# Patient Record
Sex: MALE | Race: WHITE | NOT HISPANIC OR LATINO | Employment: OTHER
[De-identification: names, ages, dates, MRNs, and addresses within clinical notes are randomized per-mention and may not be internally consistent; named-entity substitution may affect disease eponyms.]

---

## 2017-01-20 PROCEDURE — 82378 CARCINOEMBRYONIC ANTIGEN: CPT | Performed by: INTERNAL MEDICINE

## 2017-01-20 PROCEDURE — 86301 IMMUNOASSAY TUMOR CA 19-9: CPT | Performed by: INTERNAL MEDICINE

## 2017-02-01 ENCOUNTER — HOSPITAL (OUTPATIENT)
Dept: OTHER | Age: 82
End: 2017-02-01
Attending: INTERNAL MEDICINE

## 2017-02-01 LAB
ANALYZER ANC (IANC): ABNORMAL
ERYTHROCYTE [DISTWIDTH] IN BLOOD: 15.4 % (ref 11–15)
HEMATOCRIT: 38.3 % (ref 39–51)
HGB BLD-MCNC: 12.4 GM/DL (ref 13–17)
INR PPP: 1.1
MCH RBC QN AUTO: 28.3 PG (ref 26–34)
MCHC RBC AUTO-ENTMCNC: 32.4 GM/DL (ref 32–36.5)
MCV RBC AUTO: 87.4 FL (ref 78–100)
PLATELET # BLD: 206 THOUSAND/MCL (ref 140–450)
PROTHROMBIN TIME: 11.9 SECONDS (ref 9.7–11.8)
PROTHROMBIN TIME: ABNORMAL
RBC # BLD: 4.38 MILLION/MCL (ref 4.5–5.9)
WBC # BLD: 4.6 THOUSAND/MCL (ref 4.2–11)

## 2017-02-14 PROBLEM — T45.1X5A PERIPHERAL NEUROPATHY DUE TO CHEMOTHERAPY (HCC): Status: ACTIVE | Noted: 2017-02-14

## 2017-02-14 PROBLEM — G62.0 PERIPHERAL NEUROPATHY DUE TO CHEMOTHERAPY (HCC): Status: ACTIVE | Noted: 2017-02-14

## 2017-02-14 PROBLEM — C16.2 MALIGNANT NEOPLASM OF BODY OF STOMACH (HCC): Status: ACTIVE | Noted: 2017-02-14

## 2017-02-14 PROCEDURE — 84443 ASSAY THYROID STIM HORMONE: CPT | Performed by: INTERNAL MEDICINE

## 2017-02-14 PROCEDURE — 36415 COLL VENOUS BLD VENIPUNCTURE: CPT | Performed by: INTERNAL MEDICINE

## 2017-03-23 ENCOUNTER — HOSPITAL (OUTPATIENT)
Dept: OTHER | Age: 82
End: 2017-03-23
Attending: FAMILY MEDICINE

## 2017-04-11 PROCEDURE — 84443 ASSAY THYROID STIM HORMONE: CPT | Performed by: INTERNAL MEDICINE

## 2017-04-11 PROCEDURE — 36415 COLL VENOUS BLD VENIPUNCTURE: CPT | Performed by: INTERNAL MEDICINE

## 2017-07-25 PROCEDURE — 82378 CARCINOEMBRYONIC ANTIGEN: CPT | Performed by: PHYSICIAN ASSISTANT

## 2017-07-25 PROCEDURE — 86301 IMMUNOASSAY TUMOR CA 19-9: CPT | Performed by: PHYSICIAN ASSISTANT

## 2017-08-14 PROCEDURE — 82607 VITAMIN B-12: CPT | Performed by: INTERNAL MEDICINE

## 2017-08-22 ENCOUNTER — OFFICE VISIT (OUTPATIENT)
Dept: SURGERY | Facility: CLINIC | Age: 82
End: 2017-08-22

## 2017-08-22 VITALS
WEIGHT: 165 LBS | DIASTOLIC BLOOD PRESSURE: 63 MMHG | BODY MASS INDEX: 23.36 KG/M2 | HEIGHT: 70.5 IN | TEMPERATURE: 98 F | SYSTOLIC BLOOD PRESSURE: 109 MMHG | HEART RATE: 76 BPM

## 2017-08-22 DIAGNOSIS — N40.1 BENIGN PROSTATIC HYPERPLASIA WITH URINARY FREQUENCY: Primary | ICD-10-CM

## 2017-08-22 DIAGNOSIS — R35.1 NOCTURIA: ICD-10-CM

## 2017-08-22 DIAGNOSIS — R35.0 BENIGN PROSTATIC HYPERPLASIA WITH URINARY FREQUENCY: Primary | ICD-10-CM

## 2017-08-22 PROCEDURE — 99214 OFFICE O/P EST MOD 30 MIN: CPT | Performed by: UROLOGY

## 2017-08-22 PROCEDURE — G0463 HOSPITAL OUTPT CLINIC VISIT: HCPCS | Performed by: UROLOGY

## 2017-08-22 NOTE — PROGRESS NOTES
HPI:    Patient ID: Vasiliy Espinzoa is a 80year old male. HPI    1.  Nocturia   Voiding Dysfunction  Patient has current AUA score of 4, mild voiding dysfunction category, mildly worse compared to previous score of 2, mild voiding dysfunction category, o Review of previous records:  --Please see above. In addition to above. .... Finasteride started July 7, 2009. PSA 11.7 January 11, 2007.  December 21, 2006, PSA 13.9.  5 negative prostate biopsies in the past - December 1992, May 1996, April 2001, September length; large intravesical median lobe; severe large BPH obstruction; bladder 3+ trabeculated without tumors.  Tamsulosin and finasteride continued. Stopped Tamsulosin September 2016.   Patient treated with surgery in February and with chemotherapy for stom Smoking status: Never Smoker                                                              Smokeless tobacco: Never Used                      Alcohol use: Yes           1.0 oz/week     Glasses of wine: 2 per week     Comment: social              Current Out I spent 25  minutes with patient, and majority of this time was spent discussing treatment options, answering questions about treatment and coordinating care.          ASSESSMENT/PLAN:   (N40.1,  R35.0) Benign prostatic hyperplasia with urinary frequency BPH can hurt your bladder and kidneys. It can also lead to bladder stones and urinary tract infections. If you think you may have BPH, talk with your healthcare provider. Early treatment can prevent problems. Several tests can diagnose BPH.  These include: If your BPH is severe, your health care provider may recommend surgery. Surgery takes out enlarged parts of the prostate gland. Your health care provider can figure out the best option for you based on your age, overall health, and other factors.   BPH and

## 2017-08-22 NOTE — PATIENT INSTRUCTIONS
1.  Continue finasteride 5 mg daily     2. Visit in 6 months. Please do urinalysis urine test before visit  Benign Prostatic Hyperplasia    The prostate is a small gland that makes semen. As you age, the prostate grows.  If it becomes too big, it may caus exercises may also help. They strengthen the pelvic muscle to prevent urine from leaking. While urinating, contract your pelvic muscle to stop or slow down the flow of urine. Hold for 10 seconds. Repeat at least 5 times.  Do the exercise 3 to 5 times each d

## 2017-10-30 PROCEDURE — 86301 IMMUNOASSAY TUMOR CA 19-9: CPT | Performed by: PHYSICIAN ASSISTANT

## 2017-10-30 PROCEDURE — 82378 CARCINOEMBRYONIC ANTIGEN: CPT | Performed by: PHYSICIAN ASSISTANT

## 2017-12-22 ENCOUNTER — APPOINTMENT (OUTPATIENT)
Dept: LAB | Facility: HOSPITAL | Age: 82
End: 2017-12-22
Attending: UROLOGY
Payer: MEDICARE

## 2017-12-22 DIAGNOSIS — R35.1 NOCTURIA: ICD-10-CM

## 2017-12-22 PROCEDURE — 81001 URINALYSIS AUTO W/SCOPE: CPT

## 2017-12-23 DIAGNOSIS — N30.00 ACUTE CYSTITIS WITHOUT HEMATURIA: Primary | ICD-10-CM

## 2017-12-27 ENCOUNTER — TELEPHONE (OUTPATIENT)
Dept: SURGERY | Facility: CLINIC | Age: 82
End: 2017-12-27

## 2017-12-27 NOTE — TELEPHONE ENCOUNTER
----- Message from Shawn De La Garza MD sent at 12/23/2017  9:55 PM CST -----  Nurses, please call patient --  Have him submit  a urine culture because urinalysis is suspicious for possible urinary tract infection.  URINE SPECIMEN COLLECTION --    If you AR

## 2017-12-27 NOTE — TELEPHONE ENCOUNTER
Patient contacted. Relayed Dr. Zach Hodge' message below to patient. Patient is aware of his test results and verbalized understanding.   Patient is aware to submit mid stream clean catch urine specimen for urine culture, call 2 days after for his results an

## 2017-12-28 ENCOUNTER — APPOINTMENT (OUTPATIENT)
Dept: LAB | Facility: HOSPITAL | Age: 82
End: 2017-12-28
Attending: UROLOGY
Payer: MEDICARE

## 2017-12-28 DIAGNOSIS — N30.00 ACUTE CYSTITIS WITHOUT HEMATURIA: ICD-10-CM

## 2017-12-28 PROCEDURE — 87086 URINE CULTURE/COLONY COUNT: CPT

## 2017-12-28 PROCEDURE — 87077 CULTURE AEROBIC IDENTIFY: CPT

## 2017-12-28 PROCEDURE — 87186 SC STD MICRODIL/AGAR DIL: CPT

## 2018-01-02 DIAGNOSIS — N41.0 PROSTATITIS, ACUTE: Primary | ICD-10-CM

## 2018-01-02 RX ORDER — SULFAMETHOXAZOLE AND TRIMETHOPRIM 800; 160 MG/1; MG/1
1 TABLET ORAL 2 TIMES DAILY
Qty: 30 TABLET | Refills: 0 | Status: SHIPPED | OUTPATIENT
Start: 2018-01-02 | End: 2018-01-17

## 2018-01-26 PROBLEM — S21.209A: Status: ACTIVE | Noted: 2018-01-26

## 2018-01-26 PROBLEM — S31.819A WOUND OF RIGHT BUTTOCK: Status: ACTIVE | Noted: 2018-01-26

## 2018-01-26 PROCEDURE — 86301 IMMUNOASSAY TUMOR CA 19-9: CPT | Performed by: PHYSICIAN ASSISTANT

## 2018-01-26 PROCEDURE — 82378 CARCINOEMBRYONIC ANTIGEN: CPT | Performed by: PHYSICIAN ASSISTANT

## 2018-02-08 ENCOUNTER — OFFICE VISIT (OUTPATIENT)
Dept: WOUND CARE | Facility: HOSPITAL | Age: 83
End: 2018-02-08
Attending: NURSE PRACTITIONER
Payer: MEDICARE

## 2018-02-08 DIAGNOSIS — S31.819D OPEN WOUND OF RIGHT BUTTOCK, SUBSEQUENT ENCOUNTER: Primary | ICD-10-CM

## 2018-02-08 DIAGNOSIS — S21.209A OPEN WOUND OF BACK, COMPLICATED: ICD-10-CM

## 2018-02-08 PROCEDURE — 97162 PT EVAL MOD COMPLEX 30 MIN: CPT

## 2018-02-08 NOTE — PROGRESS NOTES
Subjective    Chief Complaint  This information was obtained from the patient  The patient is new to the [address] [facility],[address] here for an initial visit for the evaluation and management of non-healing wound(s).  R buttock and back pressure ulcers    Allergies  NKA diltiazem ER (XR/XT) 180 mg capsule,extended release 24 hr, controlled oral capsule,ext. rel 24h degradable oral once daily  apixaban 5 mg tablet oral tablet oral twice daily  levothyroxine 88 mcg tablet oral tablet oral once daily        Objective    Const Pt is an 80 y.o. man who presents with a superficial R buttock wound that is nearly 100% granular as well as a wound on the midback that is somewhat suspicious in nature.   This wound has moderate drainage and is 0.7cm raised with hypergranular tissue, harrington See pt 1x/week for up to 10 visits for R buttock and back wound. Pt to make an appt with derm Dr. Gabriel Snow. Will order supplies for home dressing changes.   Treatment may include nonselective and selective debridement, advanced wound dressings, possible us

## 2018-02-15 ENCOUNTER — APPOINTMENT (OUTPATIENT)
Dept: WOUND CARE | Facility: HOSPITAL | Age: 83
End: 2018-02-15
Attending: NURSE PRACTITIONER
Payer: MEDICARE

## 2018-02-15 DIAGNOSIS — S21.209D OPEN WOUND OF BACK WITH COMPLICATION, UNSPECIFIED LATERALITY, SUBSEQUENT ENCOUNTER: ICD-10-CM

## 2018-02-15 DIAGNOSIS — S31.819D OPEN WOUND OF RIGHT BUTTOCK, SUBSEQUENT ENCOUNTER: Primary | ICD-10-CM

## 2018-02-15 PROCEDURE — 82607 VITAMIN B-12: CPT | Performed by: INTERNAL MEDICINE

## 2018-02-15 PROCEDURE — 99213 OFFICE O/P EST LOW 20 MIN: CPT

## 2018-02-19 ENCOUNTER — OFFICE VISIT (OUTPATIENT)
Dept: WOUND CARE | Facility: HOSPITAL | Age: 83
End: 2018-02-19
Attending: NURSE PRACTITIONER
Payer: MEDICARE

## 2018-02-19 DIAGNOSIS — S21.209D OPEN WOUND OF BACK WITH COMPLICATION, UNSPECIFIED LATERALITY, SUBSEQUENT ENCOUNTER: Primary | ICD-10-CM

## 2018-02-19 PROCEDURE — 99212 OFFICE O/P EST SF 10 MIN: CPT

## 2018-02-19 NOTE — PROGRESS NOTES
Subjective    Chief Complaint  This information was obtained from the patient  The patient is new to the [address] [facility],[address] here for an initial visit for the evaluation and management of non-healing wound(s). R buttock and back pressure ulcers.   2/19/18: Pt stat contact to make an appt. Will try to coordinate for appt in the [facility]. Entered By: Gena Bass on 02/19/2018 4:02:09 PM   Signature(s): Date(s):  Treatment Notes Summary  Wound #2 (Back)  . Wound Treatment Note  Cleansed wound and periwound wit

## 2018-02-22 ENCOUNTER — OFFICE VISIT (OUTPATIENT)
Dept: WOUND CARE | Facility: HOSPITAL | Age: 83
End: 2018-02-22
Attending: NURSE PRACTITIONER
Payer: MEDICARE

## 2018-02-22 DIAGNOSIS — S21.209D OPEN WOUND OF BACK WITH COMPLICATION, UNSPECIFIED LATERALITY, SUBSEQUENT ENCOUNTER: Primary | ICD-10-CM

## 2018-02-22 DIAGNOSIS — S31.819D OPEN WOUND OF RIGHT BUTTOCK, SUBSEQUENT ENCOUNTER: ICD-10-CM

## 2018-02-22 PROCEDURE — 99212 OFFICE O/P EST SF 10 MIN: CPT

## 2018-02-22 NOTE — PROGRESS NOTES
Subjective    Chief Complaint  This information was obtained from the patient  The patient is new to the [address] [facility],[address] here for an initial visit for the evaluation and management of non-healing wound(s). R buttock and back pressure ulcers. 02/22/2018:  No c Wound #2 Back is a Stage 4 Pressure Injury Pressure Ulcer and has received a status of Not Healed. Measurements are 1.3cm length x 0.9cm width x 0.7cm depth, with an area of 1.17 sq cm and a volume of 0.819 cubic cm. Hypergranulation was noted.  Undermining Cleansed wound and periwound with non-cytotoxic agent. using Wound Cleanser Spray (1)  Applied Primary Wound Dressing. using Iodoform Packing Strip (1)  Applied Secondary Wound Dressing.  using Gauze (sterile) 4x4 (3)  Dressing secured with non-allergenic t

## 2018-02-26 ENCOUNTER — OFFICE VISIT (OUTPATIENT)
Dept: WOUND CARE | Facility: HOSPITAL | Age: 83
End: 2018-02-26
Attending: NURSE PRACTITIONER
Payer: MEDICARE

## 2018-02-26 DIAGNOSIS — S21.209D OPEN WOUND OF BACK WITH COMPLICATION, UNSPECIFIED LATERALITY, SUBSEQUENT ENCOUNTER: Primary | ICD-10-CM

## 2018-02-26 PROCEDURE — 99212 OFFICE O/P EST SF 10 MIN: CPT

## 2018-02-26 NOTE — PROGRESS NOTES
Subjective    Chief Complaint  This information was obtained from the patient  The patient is new to the [address] [facility],[address] here for an initial visit for the evaluation and management of non-healing wound(s). R buttock and back pressure ulcers. 02/26/2018:  No c and f/u in the wound clinic 3/1. Entered By: Martina Chase on 02/26/2018 11:03:45 AM   Signature(s): Date(s):  Treatment Notes Summary  Wound #2 (Back)  . Wound Treatment Note  Cleansed wound and periwound with non-cytotoxic agent.  using Wound C

## 2018-02-27 ENCOUNTER — OFFICE VISIT (OUTPATIENT)
Dept: SURGERY | Facility: CLINIC | Age: 83
End: 2018-02-27

## 2018-02-27 VITALS
TEMPERATURE: 98 F | HEART RATE: 68 BPM | DIASTOLIC BLOOD PRESSURE: 70 MMHG | WEIGHT: 170 LBS | BODY MASS INDEX: 24.34 KG/M2 | SYSTOLIC BLOOD PRESSURE: 117 MMHG | HEIGHT: 70 IN

## 2018-02-27 DIAGNOSIS — Z87.440 HISTORY OF UTI: ICD-10-CM

## 2018-02-27 DIAGNOSIS — R35.0 BENIGN PROSTATIC HYPERPLASIA WITH URINARY FREQUENCY: Primary | ICD-10-CM

## 2018-02-27 DIAGNOSIS — R35.1 NOCTURIA: ICD-10-CM

## 2018-02-27 DIAGNOSIS — N40.1 BENIGN PROSTATIC HYPERPLASIA WITH URINARY FREQUENCY: Primary | ICD-10-CM

## 2018-02-27 LAB
BACTERIA UR QL AUTO: NEGATIVE /HPF
BILIRUB UR QL: NEGATIVE
CLARITY UR: CLEAR
COLOR UR: YELLOW
GLUCOSE UR-MCNC: NEGATIVE MG/DL
HGB UR QL STRIP.AUTO: NEGATIVE
KETONES UR-MCNC: NEGATIVE MG/DL
NITRITE UR QL STRIP.AUTO: NEGATIVE
PH UR: 5 [PH] (ref 5–8)
PROT UR-MCNC: NEGATIVE MG/DL
RBC #/AREA URNS AUTO: 2 /HPF
SP GR UR STRIP: 1.02 (ref 1–1.03)
UROBILINOGEN UR STRIP-ACNC: <2
VIT C UR-MCNC: 20 MG/DL
WBC #/AREA URNS AUTO: 14 /HPF

## 2018-02-27 PROCEDURE — 99214 OFFICE O/P EST MOD 30 MIN: CPT | Performed by: UROLOGY

## 2018-02-27 PROCEDURE — G0463 HOSPITAL OUTPT CLINIC VISIT: HCPCS | Performed by: UROLOGY

## 2018-02-27 NOTE — PATIENT INSTRUCTIONS
1. UA and urine culture today to exclude possible UTI; we will notify you the results; if urine culture shows infection, we will likely call in antibiotic    2. Continue finasteride 5 mg daily     3. Visit in 6 months.   Please do urinalysis urine test and

## 2018-02-27 NOTE — PROGRESS NOTES
HPI:    Patient ID: Rima Nam is a 80year old male. HPI    1. Nocturia   Voiding Dysfunction  Chronic condition.  Patient has current AUA score of 10, moderate voiding dysfunction category,   worse compared to previous score of 4, mild voiding dysf Review of previous records:  --Please see above. In addition to above. .... Finasteride started July 7, 2009. PSA 11.7 January 11, 2007.  December 21, 2006, PSA 13.9.  5 negative prostate biopsies in the past - December 1992, May 1996, April 2001, September length; large intravesical median lobe; severe large BPH obstruction; bladder 3+ trabeculated without tumors.  Tamsulosin and finasteride continued. Stopped Tamsulosin September 2016.   Patient treated with surgery in February and with chemotherapy for stom 2013: LAMINECTOMY  1/6/6= Hiatal hernia, Gastric adenocarcinoma: UPPER GI ENDOSCOPY,BIOPSY   Family History   Problem Relation Age of Onset   • Heart Disorder Father    • Cancer Neg       Smoking status: Never Smoker Temp: 98.1 °F (36.7 °C)   TempSrc: Oral   Weight: 170 lb (77.1 kg)   Height: 5' 10\" (1.778 m)         Body mass index is 24.39 kg/m².     Laboratories  12/28/17: Urine Culture: >100,000 CFU/ML Escherichia coli, pt was Rxed with Bactrim DS twice daily for 1 Sade Yusuf,  personally performed the services described in this documentation. All medical record entries made by the scribe were at my direction and in my presence.   I have reviewed the chart and discharge instructions (if applicable) and agree that

## 2018-02-28 PROBLEM — S21.209A: Status: ACTIVE | Noted: 2018-02-28

## 2018-03-01 ENCOUNTER — APPOINTMENT (OUTPATIENT)
Dept: WOUND CARE | Facility: HOSPITAL | Age: 83
End: 2018-03-01
Attending: NURSE PRACTITIONER
Payer: MEDICARE

## 2018-03-01 DIAGNOSIS — S21.209D OPEN WOUND OF BACK WITH COMPLICATION, UNSPECIFIED LATERALITY, SUBSEQUENT ENCOUNTER: Primary | ICD-10-CM

## 2018-03-01 PROCEDURE — 99212 OFFICE O/P EST SF 10 MIN: CPT

## 2018-03-01 NOTE — PROGRESS NOTES
Subjective    Chief Complaint  This information was obtained from the patient  The patient is new to the [address] [facility],[address] here for an initial visit for the evaluation and management of non-healing wound(s). R buttock and back pressure ulcers. 03/1/2018:  No co General Notes: White material expressed from pinhole opening just inferior to wound bed        Assessment  Pt's back wound continues to present with tunneling/undermining, although the access to this is narrowing.   White substance continues to be expresse

## 2018-03-05 ENCOUNTER — OFFICE VISIT (OUTPATIENT)
Dept: WOUND CARE | Facility: HOSPITAL | Age: 83
End: 2018-03-05
Attending: NURSE PRACTITIONER
Payer: MEDICARE

## 2018-03-05 DIAGNOSIS — S21.209D OPEN WOUND OF BACK WITH COMPLICATION, UNSPECIFIED LATERALITY, SUBSEQUENT ENCOUNTER: Primary | ICD-10-CM

## 2018-03-05 PROCEDURE — 99211 OFF/OP EST MAY X REQ PHY/QHP: CPT

## 2018-03-05 NOTE — PROGRESS NOTES
Subjective    Chief Complaint  This information was obtained from the patient  The patient is new to the [address] [facility],[address] here for an initial visit for the evaluation and management of non-healing wound(s). R buttock and back pressure ulcers. 03/05/2018:  No c without penetration into thoracic cavity, subsequent encounter        Plan  Cont 1x/week to ensure ongoing wound progress/closure.             Entered By: Nuvia Webb on 03/05/2018 10:09:33 AM   Signature(s): Date(s):  Treatment Notes Summary  Wound #2 (

## 2018-03-12 ENCOUNTER — APPOINTMENT (OUTPATIENT)
Dept: WOUND CARE | Facility: HOSPITAL | Age: 83
End: 2018-03-12
Attending: NURSE PRACTITIONER
Payer: MEDICARE

## 2018-03-12 DIAGNOSIS — S21.209D OPEN WOUND OF BACK WITH COMPLICATION, UNSPECIFIED LATERALITY, SUBSEQUENT ENCOUNTER: Primary | ICD-10-CM

## 2018-03-12 PROCEDURE — 99212 OFFICE O/P EST SF 10 MIN: CPT

## 2018-03-12 NOTE — PROGRESS NOTES
Subjective    Chief Complaint  This information was obtained from the patient  The patient is new to the [address] [facility],[address] here for an initial visit for the evaluation and management of non-healing wound(s). R buttock and back pressure ulcers.   03/12/2018: Kalpana Blanca Pt's back wound is very nearly resolved this session, however pinpoint opening just inferior to main wound continues to express drainage/white material when palpated/expressed. Will have pt f/u again in ~ 10 days to assess if any drainage remains trapped.

## 2018-03-15 ENCOUNTER — APPOINTMENT (OUTPATIENT)
Dept: WOUND CARE | Facility: HOSPITAL | Age: 83
End: 2018-03-15
Attending: NURSE PRACTITIONER
Payer: MEDICARE

## 2018-03-19 ENCOUNTER — APPOINTMENT (OUTPATIENT)
Dept: WOUND CARE | Facility: HOSPITAL | Age: 83
End: 2018-03-19
Attending: NURSE PRACTITIONER
Payer: MEDICARE

## 2018-03-22 ENCOUNTER — OFFICE VISIT (OUTPATIENT)
Dept: WOUND CARE | Facility: HOSPITAL | Age: 83
End: 2018-03-22
Attending: NURSE PRACTITIONER
Payer: MEDICARE

## 2018-03-22 ENCOUNTER — TELEPHONE (OUTPATIENT)
Dept: SURGERY | Facility: CLINIC | Age: 83
End: 2018-03-22

## 2018-03-22 DIAGNOSIS — S21.209D OPEN WOUND OF BACK WITH COMPLICATION, UNSPECIFIED LATERALITY, SUBSEQUENT ENCOUNTER: Primary | ICD-10-CM

## 2018-03-22 PROCEDURE — 99211 OFF/OP EST MAY X REQ PHY/QHP: CPT

## 2018-03-22 NOTE — PROGRESS NOTES
Subjective    Chief Complaint  This information was obtained from the patient  The patient is new to the [address] [facility],[address] here for an initial visit for the evaluation and management of non-healing wound(s). R buttock and back pressure ulcers. 03/22/2018:  No n The periwound skin moisture is normal. The periwound skin color is normal. The periwound skin exhibited: Fluctuance.    General Notes:  Small pinhole opening inferiorlateral to wound (R) remains and sanguinous/white cyst material expressed via periwound pal The projected goal for this patient was [id number], 0% impaired. The patient's functional level at discharge is [id number], 0% impaired, determined by clinical judgement.            Entered By: Cisco Lees on 04/19/2018 2:56:34 PM   Signature(s): Date(s):  Som

## 2018-03-22 NOTE — TELEPHONE ENCOUNTER
Patient states he had a U/A and Culture done on 02/27/18 and has not received a call back with results. States has been waiting to see if he needs to start taking antibiotics again. Requesting a call back asap. Please call. Thank you.

## 2018-03-23 NOTE — TELEPHONE ENCOUNTER
Phoned pt and spoke with him. Read to him 's result note as outlined below in this encounter, in it's entirety. Pt verbalized understanding,agrees to plan, and is thankful.     Written by Kris Gauthier MD on 3/1/2018 10:57 PM   2/27/18 urine cult

## 2018-04-26 PROBLEM — R94.8 ABNORMAL POSITRON EMISSION TOMOGRAPHY (PET) SCAN: Status: ACTIVE | Noted: 2018-04-26

## 2018-04-26 PROBLEM — D64.9 ANEMIA: Status: ACTIVE | Noted: 2018-04-26

## 2018-04-26 PROCEDURE — 82378 CARCINOEMBRYONIC ANTIGEN: CPT | Performed by: PHYSICIAN ASSISTANT

## 2018-04-26 PROCEDURE — 86301 IMMUNOASSAY TUMOR CA 19-9: CPT | Performed by: PHYSICIAN ASSISTANT

## 2018-07-26 PROBLEM — G62.0 CHEMOTHERAPY-INDUCED NEUROPATHY (HCC): Status: ACTIVE | Noted: 2018-07-26

## 2018-07-26 PROBLEM — T45.1X5A CHEMOTHERAPY-INDUCED NEUROPATHY (HCC): Status: ACTIVE | Noted: 2018-07-26

## 2018-07-26 PROCEDURE — 82378 CARCINOEMBRYONIC ANTIGEN: CPT | Performed by: PHYSICIAN ASSISTANT

## 2018-07-26 PROCEDURE — 86301 IMMUNOASSAY TUMOR CA 19-9: CPT | Performed by: PHYSICIAN ASSISTANT

## 2018-08-15 PROCEDURE — 82607 VITAMIN B-12: CPT | Performed by: INTERNAL MEDICINE

## 2018-08-23 ENCOUNTER — APPOINTMENT (OUTPATIENT)
Dept: LAB | Facility: HOSPITAL | Age: 83
End: 2018-08-23
Attending: UROLOGY
Payer: MEDICARE

## 2018-08-23 DIAGNOSIS — R35.1 NOCTURIA: ICD-10-CM

## 2018-08-23 DIAGNOSIS — Z87.440 HISTORY OF UTI: ICD-10-CM

## 2018-08-23 LAB
BACTERIA UR QL AUTO: NEGATIVE /HPF
BILIRUB UR QL: NEGATIVE
CLARITY UR: CLEAR
COLOR UR: YELLOW
GLUCOSE UR-MCNC: NEGATIVE MG/DL
HGB UR QL STRIP.AUTO: NEGATIVE
KETONES UR-MCNC: NEGATIVE MG/DL
NITRITE UR QL STRIP.AUTO: NEGATIVE
PH UR: 5 [PH] (ref 5–8)
PROT UR-MCNC: NEGATIVE MG/DL
RBC #/AREA URNS AUTO: 1 /HPF
SP GR UR STRIP: 1.02 (ref 1–1.03)
UROBILINOGEN UR STRIP-ACNC: <2
VIT C UR-MCNC: NEGATIVE MG/DL
WBC #/AREA URNS AUTO: 7 /HPF

## 2018-08-23 PROCEDURE — 87086 URINE CULTURE/COLONY COUNT: CPT

## 2018-08-23 PROCEDURE — 81001 URINALYSIS AUTO W/SCOPE: CPT

## 2018-08-28 ENCOUNTER — OFFICE VISIT (OUTPATIENT)
Dept: SURGERY | Facility: CLINIC | Age: 83
End: 2018-08-28
Payer: MEDICARE

## 2018-08-28 VITALS
HEIGHT: 70 IN | HEART RATE: 62 BPM | BODY MASS INDEX: 24.34 KG/M2 | RESPIRATION RATE: 16 BRPM | DIASTOLIC BLOOD PRESSURE: 62 MMHG | WEIGHT: 170 LBS | TEMPERATURE: 98 F | SYSTOLIC BLOOD PRESSURE: 130 MMHG

## 2018-08-28 DIAGNOSIS — R35.0 BENIGN PROSTATIC HYPERPLASIA WITH URINARY FREQUENCY: Primary | ICD-10-CM

## 2018-08-28 DIAGNOSIS — Z87.440 HISTORY OF UTI: ICD-10-CM

## 2018-08-28 DIAGNOSIS — N40.1 BENIGN PROSTATIC HYPERPLASIA WITH URINARY FREQUENCY: Primary | ICD-10-CM

## 2018-08-28 DIAGNOSIS — R35.1 NOCTURIA: ICD-10-CM

## 2018-08-28 PROCEDURE — 99214 OFFICE O/P EST MOD 30 MIN: CPT | Performed by: UROLOGY

## 2018-08-28 PROCEDURE — G0463 HOSPITAL OUTPT CLINIC VISIT: HCPCS | Performed by: UROLOGY

## 2018-08-28 RX ORDER — FINASTERIDE 5 MG/1
5 TABLET, FILM COATED ORAL DAILY
COMMUNITY
End: 2018-09-21

## 2018-08-28 NOTE — PATIENT INSTRUCTIONS
1.   Continue finasteride 5 mg daily    2. Please call my office nurses on an urgent basis if you ever develop burning pain with urination, which would be suspicious for urinary tract infection    3.   Please start concentrated cranberry pill twice daily t

## 2018-08-28 NOTE — PROGRESS NOTES
HPI:    Patient ID: Krishan Barboza is a 80year old male. HPI       Nocturia  Chronic condition.  Problem started many years ago; severity: Patient has current AUA score of 5, mild voiding dysfunction category,  better compared to previous score of 10, m the prostate at least 100 grams. Urine for cytology March 6, 2007 - negative. enterococcus UTI February 2009--sensitive to Cipro with which it was treated. Urinary retention 1200 cc after Knee replacement (Left) @ [facility].  October 2011.   Ultimately Neurological: Negative for speech difficulty.      HISTORY:  Past Medical History:   Diagnosis Date   • B12 deficiency    • BPH associated with nocturia    • Elevated PSA 2010    prostate biopsies   • Essential hypertension    • H/O cystoscopy 2007   • He Cyanocobalamin (B-12) 2500 MCG Oral Tab Take by mouth.  Disp:  Rfl:    triamcinolone acetonide 0.1 % External Ointment Apply to R buttock bid Disp: 30 g Rfl: 0     Allergies:No Known Allergies   PHYSICAL EXAM:   Physical Exam   Constitutional: He appears problem is stable. Discussed continuing finasteride vs greenlight laser ablation of prostate under general anesthesia vs observation with patient including risks, benefits, and alternatives.  He indicates his understanding and decides to continue finasterid culture 3--10 days before visit.           Orders Placed This Encounter      Urinalysis, Routine- Future      URINE CULTURE, [place] FUTURE    Meds This Visit:    No prescriptions requested or ordered in this encounter       Imaging & Referrals:  None

## 2019-02-20 PROBLEM — S21.209A: Status: RESOLVED | Noted: 2018-01-26 | Resolved: 2019-02-20

## 2019-02-21 DIAGNOSIS — I10 ESSENTIAL HYPERTENSION: Primary | ICD-10-CM

## 2019-02-28 ENCOUNTER — APPOINTMENT (OUTPATIENT)
Dept: LAB | Age: 84
End: 2019-02-28
Attending: INTERNAL MEDICINE
Payer: MEDICARE

## 2019-06-25 ENCOUNTER — HOSPITAL (OUTPATIENT)
Dept: OTHER | Age: 84
End: 2019-06-25

## 2019-06-25 ENCOUNTER — DIAGNOSTIC TRANS (OUTPATIENT)
Dept: OTHER | Age: 84
End: 2019-06-25

## 2019-06-25 LAB
ANALYZER ANC (IANC): ABNORMAL
ANION GAP SERPL CALC-SCNC: 10 MMOL/L (ref 10–20)
BASOPHILS # BLD: 0 K/MCL (ref 0–0.3)
BASOPHILS NFR BLD: 1 %
BUN SERPL-MCNC: 20 MG/DL (ref 6–20)
BUN/CREAT SERPL: 21 (ref 7–25)
CALCIUM SERPL-MCNC: 9 MG/DL (ref 8.4–10.2)
CHLORIDE SERPL-SCNC: 110 MMOL/L (ref 98–107)
CO2 SERPL-SCNC: 25 MMOL/L (ref 21–32)
CREAT SERPL-MCNC: 0.95 MG/DL (ref 0.67–1.17)
DIFFERENTIAL METHOD BLD: ABNORMAL
EOSINOPHIL # BLD: 0.1 K/MCL (ref 0.1–0.5)
EOSINOPHIL NFR BLD: 1 %
ERYTHROCYTE [DISTWIDTH] IN BLOOD: 13.4 % (ref 11–15)
GLUCOSE SERPL-MCNC: 92 MG/DL (ref 65–99)
HCT VFR BLD CALC: 41 % (ref 39–51)
HGB BLD-MCNC: 13.4 G/DL (ref 13–17)
IMM GRANULOCYTES # BLD AUTO: 0.1 K/MCL (ref 0–0.2)
IMM GRANULOCYTES NFR BLD: 1 %
LYMPHOCYTES # BLD: 1.2 K/MCL (ref 1–4)
LYMPHOCYTES NFR BLD: 18 %
MCH RBC QN AUTO: 30 PG (ref 26–34)
MCHC RBC AUTO-ENTMCNC: 32.7 G/DL (ref 32–36.5)
MCV RBC AUTO: 91.9 FL (ref 78–100)
MONOCYTES # BLD: 0.8 K/MCL (ref 0.3–0.9)
MONOCYTES NFR BLD: 12 %
NEUTROPHILS # BLD: 4.6 K/MCL (ref 1.8–7.7)
NEUTROPHILS NFR BLD: 67 %
NEUTS SEG NFR BLD: ABNORMAL %
NRBC (NRBCRE): 0 /100 WBC
PLATELET # BLD: 197 K/MCL (ref 140–450)
POTASSIUM SERPL-SCNC: 4.4 MMOL/L (ref 3.4–5.1)
RBC # BLD: 4.46 MIL/MCL (ref 4.5–5.9)
SODIUM SERPL-SCNC: 141 MMOL/L (ref 135–145)
WBC # BLD: 6.7 K/MCL (ref 4.2–11)

## 2019-11-13 ENCOUNTER — OFFICE VISIT (OUTPATIENT)
Dept: SURGERY | Facility: CLINIC | Age: 84
End: 2019-11-13
Payer: MEDICARE

## 2019-11-13 VITALS
DIASTOLIC BLOOD PRESSURE: 67 MMHG | RESPIRATION RATE: 17 BRPM | HEART RATE: 78 BPM | SYSTOLIC BLOOD PRESSURE: 118 MMHG | HEIGHT: 71 IN | BODY MASS INDEX: 22.4 KG/M2 | WEIGHT: 160 LBS

## 2019-11-13 DIAGNOSIS — N40.1 BENIGN PROSTATIC HYPERPLASIA WITH URINARY FREQUENCY: ICD-10-CM

## 2019-11-13 DIAGNOSIS — Z87.440 HISTORY OF UTI: ICD-10-CM

## 2019-11-13 DIAGNOSIS — R35.1 NOCTURIA: ICD-10-CM

## 2019-11-13 DIAGNOSIS — R97.20 ELEVATED PSA: Primary | ICD-10-CM

## 2019-11-13 DIAGNOSIS — R35.0 BENIGN PROSTATIC HYPERPLASIA WITH URINARY FREQUENCY: ICD-10-CM

## 2019-11-13 PROCEDURE — G0463 HOSPITAL OUTPT CLINIC VISIT: HCPCS | Performed by: UROLOGY

## 2019-11-13 PROCEDURE — 99214 OFFICE O/P EST MOD 30 MIN: CPT | Performed by: UROLOGY

## 2019-11-13 NOTE — PROGRESS NOTES
HPI:    Patient ID: Endy Rockwell is a 80year old male. HPI     Voiding Dysfunction/Nocturia  Chronic.  Patient has current AUA score of 8, moderate voiding dysfunction category, worse compared to previous score of 5, mild voiding dysfunction category, February 28, 2007, negative except for large BPH with fish-hooking of the ureters.  To complete workup of bloody urethral discharge, flexible office cystoscopy March 5, 2007 - very, very large obstructing BPH; prostatic urethra 6 cm in length; at least mild g, no palpable nodules or indurations; Continue finasteride 5 mg daily; Start concentrated cranberry pill BID; UA and urine culture ordered      Review of Systems   Constitutional: Negative for fever. HENT: Negative for voice change.     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 He appears well-developed and well-nourished. No distress. HENT:   Head: Normocephalic and atraumatic.    Eye blood draw; prostatitis; or BPH. On PRITESH, prostate greater than 4+ enlarged, 75 grams, no palpable nodules or indurations.  In light of the available information, I fully explained to patient the benefits, risks, complications, side effects, reasons for, jovan treatment; patient understands all of this and wants to proceed with continuing observation. Patient will return in 9 months with UA completed before the visit.     (Z87.124) History of UTI  Most recent urine culture 8/23/18: Urine culture: negative.  He de 11/13/2019, 5:40 PM

## 2019-11-13 NOTE — PATIENT INSTRUCTIONS
Saman Mi M.D.      1.  Continue finasteride 5 mg daily --- I recommend that you take it in the morning instead of nighttime    2.   Try to drink almost nothing for 3 hours before bedtime; if you are thirsty, just have a

## 2020-03-03 PROBLEM — M48.061 SPINAL STENOSIS OF LUMBAR REGION WITHOUT NEUROGENIC CLAUDICATION: Status: ACTIVE | Noted: 2020-03-03

## 2020-08-10 ENCOUNTER — APPOINTMENT (OUTPATIENT)
Dept: LAB | Facility: HOSPITAL | Age: 85
End: 2020-08-10
Attending: UROLOGY
Payer: MEDICARE

## 2020-08-10 DIAGNOSIS — R35.1 NOCTURIA: ICD-10-CM

## 2020-08-10 LAB
BILIRUB UR QL: NEGATIVE
COLOR UR: YELLOW
GLUCOSE UR-MCNC: NEGATIVE MG/DL
HGB UR QL STRIP.AUTO: NEGATIVE
KETONES UR-MCNC: NEGATIVE MG/DL
NITRITE UR QL STRIP.AUTO: NEGATIVE
PH UR: 5 [PH] (ref 5–8)
PROT UR-MCNC: NEGATIVE MG/DL
RBC #/AREA URNS AUTO: 3 /HPF
SP GR UR STRIP: 1.02 (ref 1–1.03)
UROBILINOGEN UR STRIP-ACNC: <2
WBC #/AREA URNS AUTO: 182 /HPF

## 2020-08-10 PROCEDURE — 81001 URINALYSIS AUTO W/SCOPE: CPT

## 2020-08-14 ENCOUNTER — OFFICE VISIT (OUTPATIENT)
Dept: SURGERY | Facility: CLINIC | Age: 85
End: 2020-08-14
Payer: MEDICARE

## 2020-08-14 VITALS
WEIGHT: 160 LBS | HEART RATE: 80 BPM | BODY MASS INDEX: 24 KG/M2 | SYSTOLIC BLOOD PRESSURE: 131 MMHG | DIASTOLIC BLOOD PRESSURE: 77 MMHG

## 2020-08-14 DIAGNOSIS — R35.0 BENIGN PROSTATIC HYPERPLASIA WITH URINARY FREQUENCY: ICD-10-CM

## 2020-08-14 DIAGNOSIS — N20.0 KIDNEY STONE: ICD-10-CM

## 2020-08-14 DIAGNOSIS — N40.1 BENIGN PROSTATIC HYPERPLASIA WITH URINARY FREQUENCY: ICD-10-CM

## 2020-08-14 DIAGNOSIS — R82.81 PYURIA: ICD-10-CM

## 2020-08-14 DIAGNOSIS — N28.1 RENAL CYST: ICD-10-CM

## 2020-08-14 DIAGNOSIS — R97.20 ELEVATED PSA: Primary | ICD-10-CM

## 2020-08-14 DIAGNOSIS — R31.29 MICROHEMATURIA: ICD-10-CM

## 2020-08-14 DIAGNOSIS — R35.1 NOCTURIA: ICD-10-CM

## 2020-08-14 DIAGNOSIS — Z87.440 HISTORY OF UTI: ICD-10-CM

## 2020-08-14 LAB
BILIRUB UR QL: NEGATIVE
COLOR UR: YELLOW
GLUCOSE UR-MCNC: NEGATIVE MG/DL
HGB UR QL STRIP.AUTO: NEGATIVE
KETONES UR-MCNC: NEGATIVE MG/DL
NITRITE UR QL STRIP.AUTO: NEGATIVE
PH UR: 6 [PH] (ref 5–8)
PROT UR-MCNC: NEGATIVE MG/DL
RBC #/AREA URNS AUTO: 4 /HPF
SP GR UR STRIP: 1.02 (ref 1–1.03)
UROBILINOGEN UR STRIP-ACNC: <2
WBC #/AREA URNS AUTO: 173 /HPF

## 2020-08-14 PROCEDURE — G0463 HOSPITAL OUTPT CLINIC VISIT: HCPCS | Performed by: UROLOGY

## 2020-08-14 PROCEDURE — 99214 OFFICE O/P EST MOD 30 MIN: CPT | Performed by: UROLOGY

## 2020-08-14 NOTE — PATIENT INSTRUCTIONS
Gabriel Deras M.D.    1.  Urine specimen today for urine culture--we will notify you the results. Consider antibiotic if urine culture comes back positive for infection.     2.   Please continue finasteride 5 mg april

## 2021-03-15 PROBLEM — E43 SEVERE PROTEIN-CALORIE MALNUTRITION (GOMEZ: LESS THAN 60% OF STANDARD WEIGHT) (HCC): Status: ACTIVE | Noted: 2021-03-15

## 2021-03-18 ENCOUNTER — TELEPHONE (OUTPATIENT)
Dept: SURGERY | Facility: CLINIC | Age: 86
End: 2021-03-18

## 2021-03-18 NOTE — TELEPHONE ENCOUNTER
Spoke to Pt to reschedule his appointment and Pt is concerned if he has a Urinary  Infection. Pt took a UA  On 8/14/2020 and never received a call.

## 2021-03-19 NOTE — TELEPHONE ENCOUNTER
Called patient, identified by name and      Patient advised to call us if he develops any urinary issues from now until august, next appt with [facility]   Patient verbalizes and understands. Denies any urinary issues at this time.  States everything is great

## 2021-11-23 ENCOUNTER — HOSPITAL ENCOUNTER (EMERGENCY)
Age: 86
Discharge: HOME | End: 2021-11-23
Attending: EMERGENCY MEDICINE

## 2021-11-23 ENCOUNTER — TELEPHONE (OUTPATIENT)
Dept: SURGERY | Facility: CLINIC | Age: 86
End: 2021-11-23

## 2021-11-23 ENCOUNTER — APPOINTMENT (OUTPATIENT)
Dept: CT IMAGING | Age: 86
End: 2021-11-23
Attending: EMERGENCY MEDICINE

## 2021-11-23 VITALS
TEMPERATURE: 98.1 F | WEIGHT: 146.39 LBS | DIASTOLIC BLOOD PRESSURE: 69 MMHG | RESPIRATION RATE: 16 BRPM | BODY MASS INDEX: 20.42 KG/M2 | SYSTOLIC BLOOD PRESSURE: 144 MMHG | OXYGEN SATURATION: 100 % | HEART RATE: 50 BPM

## 2021-11-23 DIAGNOSIS — N40.1 BENIGN PROSTATIC HYPERPLASIA WITH URINARY OBSTRUCTION: ICD-10-CM

## 2021-11-23 DIAGNOSIS — R10.30 LOWER ABDOMINAL PAIN: Primary | ICD-10-CM

## 2021-11-23 DIAGNOSIS — M47.816 OSTEOARTHRITIS OF LUMBAR SPINE, UNSPECIFIED SPINAL OSTEOARTHRITIS COMPLICATION STATUS: ICD-10-CM

## 2021-11-23 DIAGNOSIS — N13.8 BENIGN PROSTATIC HYPERPLASIA WITH URINARY OBSTRUCTION: ICD-10-CM

## 2021-11-23 DIAGNOSIS — R33.8 ACUTE URINARY RETENTION: ICD-10-CM

## 2021-11-23 LAB
ALBUMIN SERPL-MCNC: 3.6 G/DL (ref 3.6–5.1)
ALBUMIN/GLOB SERPL: 1.1 {RATIO} (ref 1–2.4)
ALP SERPL-CCNC: 65 UNITS/L (ref 45–117)
ALT SERPL-CCNC: 29 UNITS/L
ANION GAP SERPL CALC-SCNC: 8 MMOL/L (ref 10–20)
APPEARANCE UR: ABNORMAL
APPEARANCE UR: CLEAR
AST SERPL-CCNC: 24 UNITS/L
BACTERIA #/AREA URNS HPF: ABNORMAL /HPF
BASOPHILS # BLD: 0 K/MCL (ref 0–0.3)
BASOPHILS NFR BLD: 1 %
BILIRUB SERPL-MCNC: 0.4 MG/DL (ref 0.2–1)
BILIRUB UR QL STRIP: NEGATIVE
BILIRUB UR QL STRIP: NEGATIVE
BUN SERPL-MCNC: 19 MG/DL (ref 6–20)
BUN/CREAT SERPL: 21 (ref 7–25)
CALCIUM SERPL-MCNC: 9.3 MG/DL (ref 8.4–10.2)
CHLORIDE SERPL-SCNC: 111 MMOL/L (ref 98–107)
CO2 SERPL-SCNC: 23 MMOL/L (ref 21–32)
COLOR UR: YELLOW
COLOR UR: YELLOW
CREAT SERPL-MCNC: 0.91 MG/DL (ref 0.67–1.17)
DEPRECATED RDW RBC: 47.5 FL (ref 39–50)
EOSINOPHIL # BLD: 0.1 K/MCL (ref 0–0.5)
EOSINOPHIL NFR BLD: 2 %
ERYTHROCYTE [DISTWIDTH] IN BLOOD: 14 % (ref 11–15)
FASTING DURATION TIME PATIENT: ABNORMAL H
GFR SERPLBLD BASED ON 1.73 SQ M-ARVRAT: 72 ML/MIN
GLOBULIN SER-MCNC: 3.2 G/DL (ref 2–4)
GLUCOSE SERPL-MCNC: 100 MG/DL (ref 70–99)
GLUCOSE UR STRIP-MCNC: NEGATIVE MG/DL
GLUCOSE UR STRIP-MCNC: NEGATIVE MG/DL
HCT VFR BLD CALC: 39.7 % (ref 39–51)
HGB BLD-MCNC: 13 G/DL (ref 13–17)
HGB UR QL STRIP: ABNORMAL
HGB UR QL STRIP: ABNORMAL
HYALINE CASTS #/AREA URNS LPF: ABNORMAL /LPF
IMM GRANULOCYTES # BLD AUTO: 0 K/MCL (ref 0–0.2)
IMM GRANULOCYTES # BLD: 0 %
KETONES UR STRIP-MCNC: ABNORMAL MG/DL
KETONES UR STRIP-MCNC: NEGATIVE MG/DL
LEUKOCYTE ESTERASE UR QL STRIP: ABNORMAL
LEUKOCYTE ESTERASE UR QL STRIP: ABNORMAL
LIPASE SERPL-CCNC: 107 UNITS/L (ref 73–393)
LYMPHOCYTES # BLD: 1.6 K/MCL (ref 1–4)
LYMPHOCYTES NFR BLD: 33 %
MCH RBC QN AUTO: 30.4 PG (ref 26–34)
MCHC RBC AUTO-ENTMCNC: 32.7 G/DL (ref 32–36.5)
MCV RBC AUTO: 92.8 FL (ref 78–100)
MONOCYTES # BLD: 0.5 K/MCL (ref 0.3–0.9)
MONOCYTES NFR BLD: 10 %
MUCOUS THREADS URNS QL MICRO: PRESENT
NEUTROPHILS # BLD: 2.6 K/MCL (ref 1.8–7.7)
NEUTROPHILS NFR BLD: 54 %
NITRITE UR QL STRIP: NEGATIVE
NITRITE UR QL STRIP: NEGATIVE
NRBC BLD MANUAL-RTO: 0 /100 WBC
PH UR STRIP: 6 UNITS (ref 5–7)
PH UR STRIP: 6 [PH] (ref 5–7)
PLATELET # BLD AUTO: 194 K/MCL (ref 140–450)
POTASSIUM SERPL-SCNC: 4 MMOL/L (ref 3.4–5.1)
PROT SERPL-MCNC: 6.8 G/DL (ref 6.4–8.2)
PROT UR STRIP-MCNC: 30 MG/DL
PROT UR STRIP-MCNC: NEGATIVE MG/DL
RAINBOW EXTRA TUBES HOLD SPECIMEN: NORMAL
RBC # BLD: 4.28 MIL/MCL (ref 4.5–5.9)
RBC #/AREA URNS HPF: >100 /HPF
SODIUM SERPL-SCNC: 138 MMOL/L (ref 135–145)
SP GR UR STRIP: 1.01 (ref 1–1.03)
SP GR UR STRIP: >1.03 (ref 1–1.03)
SQUAMOUS #/AREA URNS HPF: ABNORMAL /HPF
UROBILINOGEN UR STRIP-MCNC: 0.2 MG/DL
UROBILINOGEN UR STRIP-MCNC: 0.2 MG/DL
WBC # BLD: 4.8 K/MCL (ref 4.2–11)
WBC #/AREA URNS HPF: ABNORMAL /HPF

## 2021-11-23 PROCEDURE — 83690 ASSAY OF LIPASE: CPT | Performed by: EMERGENCY MEDICINE

## 2021-11-23 PROCEDURE — 10002805 HB CONTRAST AGENT: Performed by: EMERGENCY MEDICINE

## 2021-11-23 PROCEDURE — 36415 COLL VENOUS BLD VENIPUNCTURE: CPT

## 2021-11-23 PROCEDURE — 81001 URINALYSIS AUTO W/SCOPE: CPT | Performed by: EMERGENCY MEDICINE

## 2021-11-23 PROCEDURE — 51702 INSERT TEMP BLADDER CATH: CPT

## 2021-11-23 PROCEDURE — 99285 EMERGENCY DEPT VISIT HI MDM: CPT

## 2021-11-23 PROCEDURE — 74177 CT ABD & PELVIS W/CONTRAST: CPT

## 2021-11-23 PROCEDURE — 80053 COMPREHEN METABOLIC PANEL: CPT | Performed by: EMERGENCY MEDICINE

## 2021-11-23 PROCEDURE — 87088 URINE BACTERIA CULTURE: CPT | Performed by: EMERGENCY MEDICINE

## 2021-11-23 PROCEDURE — 85025 COMPLETE CBC W/AUTO DIFF WBC: CPT | Performed by: EMERGENCY MEDICINE

## 2021-11-23 PROCEDURE — 81003 URINALYSIS AUTO W/O SCOPE: CPT

## 2021-11-23 RX ORDER — ONDANSETRON 2 MG/ML
4 INJECTION INTRAMUSCULAR; INTRAVENOUS ONCE
Status: DISCONTINUED | OUTPATIENT
Start: 2021-11-23 | End: 2021-11-23 | Stop reason: HOSPADM

## 2021-11-23 RX ORDER — ASPIRIN 81 MG/1
81 TABLET, CHEWABLE ORAL DAILY
COMMUNITY

## 2021-11-23 RX ORDER — CELECOXIB 100 MG/1
100 CAPSULE ORAL 2 TIMES DAILY
Qty: 30 CAPSULE | Refills: 0 | Status: SHIPPED | OUTPATIENT
Start: 2021-11-23

## 2021-11-23 RX ORDER — DILTIAZEM HYDROCHLORIDE 180 MG/1
180 CAPSULE, EXTENDED RELEASE ORAL
COMMUNITY
Start: 2015-09-11

## 2021-11-23 RX ORDER — ENALAPRIL MALEATE 10 MG/1
10 TABLET ORAL NIGHTLY
COMMUNITY
Start: 2016-01-27

## 2021-11-23 RX ORDER — FINASTERIDE 5 MG/1
TABLET, FILM COATED ORAL
COMMUNITY

## 2021-11-23 RX ORDER — LEVOTHYROXINE SODIUM 112 UG/1
112 TABLET ORAL DAILY
COMMUNITY

## 2021-11-23 RX ADMIN — IOHEXOL 85 ML: 350 INJECTION, SOLUTION INTRAVENOUS at 02:25

## 2021-11-23 ASSESSMENT — PAIN DESCRIPTION - PAIN TYPE: TYPE: ACUTE PAIN

## 2021-11-23 ASSESSMENT — PAIN SCALES - GENERAL: PAINLEVEL_OUTOF10: 10

## 2021-11-23 NOTE — TELEPHONE ENCOUNTER
Spoke with patient. Patient states he went to the [facility] yesterday and was diagnosed with urinary retention. Patient states he had catheter placed. Patient was advised to have catheter removed in 3-7 days.  Patient states he did not want to follo

## 2021-11-23 NOTE — TELEPHONE ENCOUNTER
Per pt needs appt next week to see Dr. Marycruz Estrada and to remove catheter, no openings available. Please advise thank you.

## 2021-11-24 NOTE — TELEPHONE ENCOUNTER
Please call patient back----    Please set patient up for nurse visit Bueno catheter removal voiding trial either this Friday, November 26 (is as long as it is okay and I'm off that day) or else Monday, November 29; voiding trial should be in the morning.

## 2021-11-24 NOTE — TELEPHONE ENCOUNTER
Spoke with patient, assisted in scheduling patient for nurse visit on Monday 11/29/21. Patient states he will need to check with his daughter to confirm. States he will call back if he needs to reschedule.      Future Appointments   Date Time Provider Depar

## 2021-11-25 LAB — BACTERIA UR CULT: ABNORMAL

## 2021-11-26 RX ORDER — METRONIDAZOLE 500 MG/1
500 TABLET ORAL 3 TIMES DAILY
Qty: 30 TABLET | Refills: 0 | Status: SHIPPED | OUTPATIENT
Start: 2021-11-26 | End: 2021-12-06

## 2021-11-29 ENCOUNTER — NURSE ONLY (OUTPATIENT)
Dept: SURGERY | Facility: CLINIC | Age: 86
End: 2021-11-29
Payer: MEDICARE

## 2021-11-29 ENCOUNTER — TELEPHONE (OUTPATIENT)
Dept: SURGERY | Facility: CLINIC | Age: 86
End: 2021-11-29

## 2021-11-29 VITALS — SYSTOLIC BLOOD PRESSURE: 157 MMHG | DIASTOLIC BLOOD PRESSURE: 89 MMHG | HEART RATE: 92 BPM

## 2021-11-29 DIAGNOSIS — R33.9 URINARY RETENTION: Primary | ICD-10-CM

## 2021-11-29 RX ORDER — CELECOXIB 100 MG/1
100 CAPSULE ORAL 2 TIMES DAILY
COMMUNITY
Start: 2021-11-23

## 2021-11-29 RX ORDER — METRONIDAZOLE 500 MG/1
1 TABLET ORAL 3 TIMES DAILY
COMMUNITY
Start: 2021-11-26 | End: 2021-12-06

## 2021-11-29 NOTE — TELEPHONE ENCOUNTER
Called pt to check on status post decath earlier today. Verified name and . Pt states after he left today he has voided twice.    He ate watermelon and melon after his trip to [facility] today after leaving office and at   1:15pm voided 185ml (6.5 fl oz)

## 2021-11-29 NOTE — PROGRESS NOTES
Patient presents for Monday Nov 29th voiding trial, urine culture from garner catheter before fill up and catheter removed for voiding trial per PV[facility]. Pt does state he is taking his finasteride daily.  He states he has withheld his blood pressure meds las

## 2021-11-29 NOTE — TELEPHONE ENCOUNTER
Pt called back to let me know/ confirm Thursday at 2:30pm he is coming to visit with [facility]K as well as [facility], he has arranged a taxi to come and go from f/up visit.

## 2021-11-30 ENCOUNTER — TELEPHONE (OUTPATIENT)
Dept: SURGERY | Facility: CLINIC | Age: 86
End: 2021-11-30

## 2021-11-30 NOTE — TELEPHONE ENCOUNTER
Pt calling to talk to Rn - about appt on Thursday - asking why his daughter was called to confirm appt for this Thursday - he did not want her to know about appt

## 2021-11-30 NOTE — TELEPHONE ENCOUNTER
S/W pt and he states that his dtr got that call that states to confirm your appt on thurs press 1 and he stated that his dtr got this call. I told pt that this is a call system that probably called her because she is on the release forms.  I told him that n

## 2021-12-02 ENCOUNTER — OFFICE VISIT (OUTPATIENT)
Dept: SURGERY | Facility: CLINIC | Age: 86
End: 2021-12-02
Payer: MEDICARE

## 2021-12-02 VITALS — WEIGHT: 151 LBS | BODY MASS INDEX: 21.62 KG/M2 | HEIGHT: 70 IN

## 2021-12-02 DIAGNOSIS — R35.0 BENIGN PROSTATIC HYPERPLASIA WITH URINARY FREQUENCY: ICD-10-CM

## 2021-12-02 DIAGNOSIS — R82.81 PYURIA: ICD-10-CM

## 2021-12-02 DIAGNOSIS — N40.1 BENIGN PROSTATIC HYPERPLASIA WITH URINARY FREQUENCY: ICD-10-CM

## 2021-12-02 DIAGNOSIS — N39.0 RECURRENT UTI: Primary | ICD-10-CM

## 2021-12-02 DIAGNOSIS — N28.1 RENAL CYST: ICD-10-CM

## 2021-12-02 DIAGNOSIS — R33.9 URINARY RETENTION: ICD-10-CM

## 2021-12-02 DIAGNOSIS — N20.0 KIDNEY STONE: ICD-10-CM

## 2021-12-02 DIAGNOSIS — R35.1 NOCTURIA: ICD-10-CM

## 2021-12-02 DIAGNOSIS — R31.29 MICROHEMATURIA: ICD-10-CM

## 2021-12-02 PROCEDURE — 99214 OFFICE O/P EST MOD 30 MIN: CPT | Performed by: UROLOGY

## 2021-12-02 PROCEDURE — 51798 US URINE CAPACITY MEASURE: CPT | Performed by: UROLOGY

## 2021-12-02 NOTE — PATIENT INSTRUCTIONS
Emanuel Zaidi M.D.      1.  Continue finasteride 5 mg daily    2. Please complete your 10-day course of metronidazole 500 mg 3 times daily ordered by [facility] emergency room    3.   Bladder scan for

## 2021-12-02 NOTE — PROGRESS NOTES
HPI:    Patient ID: Chantal Olivares is a 80year old male.     HPI    Urinary Retention  On 11/22/2021 The patient was having acute intermittent lower abdominal pain which is cramping with sharp exacerbations and radiates to his back and is severe at times a He denies any associated dysuria. He feels this is stable.     Anticoagulated   Patient is currently taking Eliquis 5 mg daily.            Review of previous records:    Finasteride started July 7, 2009. PSA 11.7 January 11, 2007.  December 21, 2006, PSA 1 Cytology negative.  8/10/15 office cystoscopy for gross hematuria = aesthetic urethra 5 cm in length; large intravesical median lobe; severe large BPH obstruction; bladder 3+ trabeculated without tumors.  Tamsulosin and finasteride continued.  Stopped Tamsu • Enalapril Maleate 20 MG Oral Tab Take 1 tablet (20 mg total) by mouth once daily. 90 tablet 3   • dilTIAZem HCl  MG Oral Capsule SR 24 Hr Take 1 capsule (120 mg total) by mouth daily.  90 capsule 3   • LEVOTHYROXINE SODIUM 88 MCG Oral Tab TAKE 1 T Vaping Use      Vaping Use: Never used    Alcohol use:  Yes      Alcohol/week: 1.7 standard drinks      Types: 2 Glasses of wine per week      Comment: social    Drug use: No       Over the past month, how often have you had a sensation of not emptying your visit.          LABORATORIES  11/29/2021 Urine culture = No Growth 2 Days;   11/23/2021 ([facility]) Urine culture  = >100k cfu/mL Gardnerella vaginalis -- prescribed Metronidazole TID x 10 days; Creatinine = 0.91; GFR = 72  07/06/2021 Creatinien = 0.9 retention, decision made to perform an office bladder scan today   12/02/20212 Office Bladder Scan = PVR 72 mL    Excluding bladder scan,  I spent 32  minutes with patient and on this case today, in reviewing chart and labs before entering the room, taking stable and chooses to continue medication as prescribed.     (N39.0) Recurrent UTI  On 11/23/2021 ([facility]) Urine culture  = >100k cfu/mL Gardnerella vaginalis -- prescribed Metronidazole TID x 10 days.  Most recent 11/29/2021 Urine culture = No Grow intravesical median lobe reported on 01/2020 CT; he understands.           I explained to patient the risks, side effects, and alternatives, and I answered questions concerning them; patient understands all of this and decides to proceed with the following

## 2022-08-23 ENCOUNTER — WALK IN (OUTPATIENT)
Dept: URGENT CARE | Age: 87
End: 2022-08-23
Attending: FAMILY MEDICINE

## 2022-08-23 VITALS
TEMPERATURE: 98.3 F | RESPIRATION RATE: 18 BRPM | DIASTOLIC BLOOD PRESSURE: 83 MMHG | OXYGEN SATURATION: 95 % | SYSTOLIC BLOOD PRESSURE: 142 MMHG | HEART RATE: 90 BPM

## 2022-08-23 DIAGNOSIS — H61.23 BILATERAL IMPACTED CERUMEN: Primary | ICD-10-CM

## 2022-08-23 PROCEDURE — 99212 OFFICE O/P EST SF 10 MIN: CPT

## 2022-08-23 PROCEDURE — 69210 REMOVE IMPACTED EAR WAX UNI: CPT

## 2022-08-23 ASSESSMENT — PAIN SCALES - GENERAL
PAINLEVEL: 0
PAINLEVEL_OUTOF10: 0

## 2022-08-24 ASSESSMENT — ENCOUNTER SYMPTOMS
HEADACHES: 0
NAUSEA: 0
ABDOMINAL PAIN: 0
SORE THROAT: 0
SINUS PRESSURE: 0
AGITATION: 0
COUGH: 0
FEVER: 0
WEAKNESS: 0
EYE REDNESS: 0
DIZZINESS: 0
DIARRHEA: 0
SHORTNESS OF BREATH: 0
VOMITING: 0
ADENOPATHY: 0

## 2025-05-16 ENCOUNTER — OFFICE VISIT (OUTPATIENT)
Dept: WOUND CARE | Facility: HOSPITAL | Age: OVER 89
End: 2025-05-16
Attending: INTERNAL MEDICINE
Payer: MEDICARE

## 2025-05-16 VITALS
DIASTOLIC BLOOD PRESSURE: 59 MMHG | HEART RATE: 41 BPM | TEMPERATURE: 99 F | SYSTOLIC BLOOD PRESSURE: 145 MMHG | RESPIRATION RATE: 16 BRPM

## 2025-05-16 DIAGNOSIS — I48.0 PAROXYSMAL ATRIAL FIBRILLATION (HCC): ICD-10-CM

## 2025-05-16 DIAGNOSIS — C44.42 SQUAMOUS CELL CARCINOMA, SCALP/NECK: ICD-10-CM

## 2025-05-16 DIAGNOSIS — I10 ESSENTIAL HYPERTENSION: ICD-10-CM

## 2025-05-16 DIAGNOSIS — R23.8 SLOUGHING OF WOUND: ICD-10-CM

## 2025-05-16 DIAGNOSIS — S01.01XA LACERATION OF SCALP WITHOUT FOREIGN BODY, INITIAL ENCOUNTER: Primary | ICD-10-CM

## 2025-05-16 PROCEDURE — 99214 OFFICE O/P EST MOD 30 MIN: CPT

## 2025-05-16 NOTE — PROGRESS NOTES
Weekly Wound Education Note    Teaching Provided To: Patient, Family  Training Topics: Cleasing and general instructions, Discharge instructions, Dressing  Training Method: Explain/Verbal  Training Response: Patient responds and understands        Notes: Initial visit for head wound due to mohs surgery n 5/7/25. Seen by plastic surgeon a few days ago. Honey gel, honey alginate, bordered gauze applied - 3 times a week. Provided with a couple dressings. Orders/referral sent to [facility].

## 2025-05-16 NOTE — PATIENT INSTRUCTIONS
Consult home health  For skilled nursing for dressing changes 3 times a week.  Return 2 weeks.     Wound Cleaning and Dressings:    Wash your hands with soap and water. Always wear gloves while changing dressings. Donot touch wound / maddy-wound skin with un-gloved hands. Remove old dressing, discard and place into trash.      DRESSINGS: honey gel / honey alginate / bordered gauze  Change dressing every other day      Off-Loading:  Offloading in the region.  No pressure on the wound.    Miscellaneous Instructions:  Supplement with a daily multivitamin   Increase protein intake / consider protein supplements - see below    DIETARY MODIFICATIONS TO HELP WITH WOUND HEALING:    Protein: Meats, beans, eggs, milk and yogurt particularly Greek yogurt), tofu, soy nuts, soy protein products    Vitamin C: Citrus fruits and juices, strawberries, tomatoes, tomato juice, peppers, baked potatoes, spinach, broccoli, cauliflower, Justice sprouts, cabbage    Vitamin A: Dark green, leafy vegetables, orange or yellow vegetables, cantaloupe, fortified dairy products, liver, fortified cereals    Zinc: Fortified cereals, red meats, seafood    Consider Michael [employer] (These are essential branch chain amino acids that help with tissue building and wound healing) and take 2 packets/day. you can order online at [employer] or [employer]    ADDITIONAL REMINDERS:    The treatment plan has been discussed at length with you and your provider. Follow all instructions carefully, it is very important. If you do not follow all instructions, you are at  risk of your wound not healing, infection, possible loss of limb and even end of life.  Please call the clinic during regular business hours ( 7:30 AM - 5:30 PM) if you notice increased bleeding, redness, warmth, pain or pus like drainage or start running a fever greater than 100.3.    For after hour emergencies, please call your primary physician or go to the nearest emergency room.

## 2025-05-19 ENCOUNTER — TELEPHONE (OUTPATIENT)
Dept: WOUND CARE | Facility: HOSPITAL | Age: OVER 89
End: 2025-05-19

## 2025-05-19 NOTE — TELEPHONE ENCOUNTER
Daughter called asking to cancel appointments here are [facility] and requesting paperwork to be faxed to [facility]. Last week progress note faxed to [facility]. Spoke to daughter informed her that [facility] and that notes were faxed to other wound clinic. She state it is closer to their home.

## 2025-05-27 ENCOUNTER — APPOINTMENT (OUTPATIENT)
Dept: WOUND CARE | Age: OVER 89
End: 2025-05-27
Attending: INTERNAL MEDICINE

## 2025-05-30 ENCOUNTER — APPOINTMENT (OUTPATIENT)
Dept: WOUND CARE | Facility: HOSPITAL | Age: OVER 89
End: 2025-05-30
Attending: INTERNAL MEDICINE
Payer: MEDICARE

## 2025-06-03 ENCOUNTER — HOSPITAL ENCOUNTER (OUTPATIENT)
Dept: WOUND CARE | Age: OVER 89
Discharge: STILL ADMITTED OR ADMINISTRATIVE | End: 2025-06-03
Attending: INTERNAL MEDICINE

## 2025-06-03 VITALS
HEART RATE: 55 BPM | DIASTOLIC BLOOD PRESSURE: 83 MMHG | OXYGEN SATURATION: 99 % | TEMPERATURE: 96.8 F | SYSTOLIC BLOOD PRESSURE: 150 MMHG

## 2025-06-03 DIAGNOSIS — E46 PROTEIN MALNUTRITION  (CMD): ICD-10-CM

## 2025-06-03 DIAGNOSIS — E03.9 HYPOTHYROIDISM (ACQUIRED): ICD-10-CM

## 2025-06-03 DIAGNOSIS — S01.80XA OPEN WOUND OF OTHER PART OF HEAD, UNSPECIFIED OPEN WOUND TYPE, INITIAL ENCOUNTER: ICD-10-CM

## 2025-06-03 DIAGNOSIS — E78.5 DYSLIPIDEMIA: ICD-10-CM

## 2025-06-03 DIAGNOSIS — S01.80XA OPEN WOUND OF OTHER PART OF HEAD, UNSPECIFIED OPEN WOUND TYPE, INITIAL ENCOUNTER: Primary | ICD-10-CM

## 2025-06-03 DIAGNOSIS — I48.0 PAROXYSMAL ATRIAL FIBRILLATION  (CMD): ICD-10-CM

## 2025-06-03 DIAGNOSIS — I10 ESSENTIAL HYPERTENSION, BENIGN: ICD-10-CM

## 2025-06-03 DIAGNOSIS — Z85.028 HISTORY OF GASTRIC CANCER: ICD-10-CM

## 2025-06-03 PROBLEM — S01.90XA OPEN WOUND OF HEAD: Status: ACTIVE | Noted: 2025-06-03

## 2025-06-03 PROCEDURE — 99213 OFFICE O/P EST LOW 20 MIN: CPT

## 2025-06-03 ASSESSMENT — PAIN SCALES - GENERAL: PAINLEVEL_OUTOF10: 1

## 2025-06-18 ENCOUNTER — HOSPITAL ENCOUNTER (OUTPATIENT)
Dept: WOUND CARE | Age: OVER 89
Discharge: STILL ADMITTED OR ADMINISTRATIVE | End: 2025-06-18
Attending: INTERNAL MEDICINE

## 2025-06-18 VITALS — TEMPERATURE: 97.5 F

## 2025-06-18 DIAGNOSIS — S01.00XD OPEN WOUND OF SCALP, UNSPECIFIED OPEN WOUND TYPE, SUBSEQUENT ENCOUNTER: Primary | ICD-10-CM

## 2025-06-18 DIAGNOSIS — S01.80XA OPEN WOUND OF OTHER PART OF HEAD, UNSPECIFIED OPEN WOUND TYPE, INITIAL ENCOUNTER: ICD-10-CM

## 2025-06-18 PROCEDURE — 11044 DBRDMT BONE 1ST 20 SQ CM/<: CPT

## 2025-06-18 ASSESSMENT — PAIN SCALES - GENERAL: PAINLEVEL_OUTOF10: 0

## 2025-07-09 ENCOUNTER — HOSPITAL ENCOUNTER (OUTPATIENT)
Dept: WOUND CARE | Age: OVER 89
Discharge: STILL ADMITTED OR ADMINISTRATIVE | End: 2025-07-09
Attending: INTERNAL MEDICINE

## 2025-07-09 VITALS — TEMPERATURE: 96.8 F

## 2025-07-09 DIAGNOSIS — S01.80XA OPEN WOUND OF OTHER PART OF HEAD, UNSPECIFIED OPEN WOUND TYPE, INITIAL ENCOUNTER: Primary | ICD-10-CM

## 2025-07-09 PROCEDURE — 10006413 HB SUPPLY SKIN SUBSTITUTES

## 2025-07-09 PROCEDURE — 15275 SKIN SUB GRAFT FACE/NK/HF/G: CPT

## 2025-07-09 ASSESSMENT — ENCOUNTER SYMPTOMS
RESPIRATORY NEGATIVE: 1
EYES NEGATIVE: 1
NEUROLOGICAL NEGATIVE: 1
GASTROINTESTINAL NEGATIVE: 1
PSYCHIATRIC NEGATIVE: 1
CONSTITUTIONAL NEGATIVE: 1

## 2025-07-09 ASSESSMENT — PAIN SCALES - GENERAL: PAINLEVEL_OUTOF10: 0

## 2025-07-16 ENCOUNTER — HOSPITAL ENCOUNTER (OUTPATIENT)
Dept: WOUND CARE | Age: OVER 89
Discharge: STILL ADMITTED OR ADMINISTRATIVE | End: 2025-07-16
Attending: INTERNAL MEDICINE

## 2025-07-16 VITALS — TEMPERATURE: 96.8 F

## 2025-07-16 DIAGNOSIS — S01.80XA OPEN WOUND OF OTHER PART OF HEAD, UNSPECIFIED OPEN WOUND TYPE, INITIAL ENCOUNTER: Primary | ICD-10-CM

## 2025-07-16 PROCEDURE — 10006413 HB SUPPLY SKIN SUBSTITUTES

## 2025-07-16 PROCEDURE — 15275 SKIN SUB GRAFT FACE/NK/HF/G: CPT

## 2025-07-16 ASSESSMENT — ENCOUNTER SYMPTOMS
CONSTITUTIONAL NEGATIVE: 1
GASTROINTESTINAL NEGATIVE: 1
PSYCHIATRIC NEGATIVE: 1
RESPIRATORY NEGATIVE: 1
EYES NEGATIVE: 1
NEUROLOGICAL NEGATIVE: 1

## 2025-07-16 ASSESSMENT — PAIN SCALES - GENERAL: PAINLEVEL_OUTOF10: 0

## 2025-08-13 ENCOUNTER — HOSPITAL ENCOUNTER (OUTPATIENT)
Dept: WOUND CARE | Age: OVER 89
Discharge: STILL ADMITTED OR ADMINISTRATIVE | End: 2025-08-13
Attending: INTERNAL MEDICINE

## 2025-08-13 VITALS — TEMPERATURE: 96.8 F

## 2025-08-13 DIAGNOSIS — S01.80XA OPEN WOUND OF OTHER PART OF HEAD, UNSPECIFIED OPEN WOUND TYPE, INITIAL ENCOUNTER: Primary | ICD-10-CM

## 2025-08-13 PROCEDURE — 10006413 HB SUPPLY SKIN SUBSTITUTES

## 2025-08-13 PROCEDURE — 15275 SKIN SUB GRAFT FACE/NK/HF/G: CPT

## 2025-08-13 ASSESSMENT — PAIN SCALES - GENERAL: PAINLEVEL_OUTOF10: 0

## 2025-08-20 ENCOUNTER — HOSPITAL ENCOUNTER (OUTPATIENT)
Dept: WOUND CARE | Age: OVER 89
Discharge: STILL ADMITTED OR ADMINISTRATIVE | End: 2025-08-20
Attending: INTERNAL MEDICINE

## 2025-08-20 VITALS — TEMPERATURE: 97.2 F

## 2025-08-20 DIAGNOSIS — S01.80XA OPEN WOUND OF OTHER PART OF HEAD, UNSPECIFIED OPEN WOUND TYPE, INITIAL ENCOUNTER: Primary | ICD-10-CM

## 2025-08-20 PROCEDURE — 10006413 HB SUPPLY SKIN SUBSTITUTES

## 2025-08-20 PROCEDURE — 15275 SKIN SUB GRAFT FACE/NK/HF/G: CPT

## 2025-08-20 ASSESSMENT — PAIN SCALES - GENERAL: PAINLEVEL_OUTOF10: 0

## 2025-08-27 ENCOUNTER — HOSPITAL ENCOUNTER (OUTPATIENT)
Dept: WOUND CARE | Age: OVER 89
Discharge: STILL ADMITTED OR ADMINISTRATIVE | End: 2025-08-27
Attending: INTERNAL MEDICINE

## 2025-08-27 VITALS — TEMPERATURE: 96.8 F

## 2025-08-27 DIAGNOSIS — S01.80XA OPEN WOUND OF OTHER PART OF HEAD, UNSPECIFIED OPEN WOUND TYPE, INITIAL ENCOUNTER: Primary | ICD-10-CM

## 2025-08-27 PROCEDURE — 99213 OFFICE O/P EST LOW 20 MIN: CPT | Performed by: SURGERY

## 2025-08-27 ASSESSMENT — ENCOUNTER SYMPTOMS
GASTROINTESTINAL NEGATIVE: 1
RESPIRATORY NEGATIVE: 1
CONSTITUTIONAL NEGATIVE: 1
PSYCHIATRIC NEGATIVE: 1
NEUROLOGICAL NEGATIVE: 1
EYES NEGATIVE: 1

## 2025-08-27 ASSESSMENT — PAIN SCALES - GENERAL: PAINLEVEL_OUTOF10: 0

## (undated) NOTE — LETTER
02/08/18    Dear Dr. Sussy Miguel,    Thank you for allowing us the opportunity to evaluate your patient, Romain Duarte, date of birth 11/8/1928, at [address] [place] [address].  He was seen on 2/8/2018 and   his wound(s) evaluated with a plan of care [place] tubular adenoma of colon  elevated PSA  H/O cystoscopy  spinal stenosis  hemorrhage of prostate  BPH associated with nocturia  nephrolithiasis  Hyperthyroidism  paroxysmal atrial fibrillation  malignant neoplasm of body of stomach  peripheral neropathy  Hy Unable to fully assess wound/undermining due to pain as well as significant hypergranulation        Pt is an 80 y.o. man who presents with a superficial R buttock wound that is nearly 100% granular as well as a wound on the midback that is somewhat suspici dressing changes. Treatment may include nonselective and selective debridement, advanced wound dressings, possible use of negative pressure wound therapy if indicated after consultation, and pt education.   Pt may consider purchasing EHOB cushion to reduce

## (undated) NOTE — LETTER
04/19/18    Dear Oli De Jesus,    Your Patient, Tray Harris, date of birth 11/8/1928, has been treated at [facility] for his wound(s).  He has recently completed treatment and a brief case study to summarize his course of monitoring. Does not feel he needs to come in at this time- will discharge. The projected goal for this patient was [id number], 0% impaired. The patient's functional level at discharge is [id number], 0% impaired, determined by clinical judgement.        Best

## (undated) NOTE — LETTER
Date: 5/16/2025  Patient name: Jamaal Simon  YOB: 1928  Medical Record Number: [id number]  Primary Coverage: Payor: MEDICARE / Plan: MEDICARE PART A&B / Product Type: *No Product type* /   [place]  Insurance ID: [id number]  Patient Address: [address] [place] [id number]  Telephone Information:   Home Phone [phone]   Mobile [phone]       Encounter Date: 5/16/2025  Provider: Abdullahi Prescott MD  Diagnosis:     ICD-10-CM   1. Laceration of scalp without foreign body, initial encounter  S01.01XA   2. Squamous cell carcinoma, scalp/neck  C44.42   3. Sloughing of wound  R23.8   4. Essential hypertension  I10   5. Paroxysmal atrial fibrillation (HCC)  I48.0         Wound 05/16/25 #1 Head Head Posterior;Upper (Active)   Date First Assessed/Time First Assessed: 05/16/25 1325    Wound Number (Wound Clinic Only): #1 Head  Primary Wound Type: (c) Other (comment)  Location: Head  Wound Location Orientation: Posterior;Upper      Assessments 5/16/2025  1:27 PM   Wound Image      Drainage Amount Small   Drainage Description Serous;Clear   Wound Length (cm) 4.5 cm   Wound Width (cm) 4.2 cm   Wound Surface Area (cm^2) 14.84 cm^2   Wound Depth (cm) 0.1 cm   Wound Volume (cm^3) 0.99 cm^3   Margins Well-defined edges   Non-staged Wound Description Full thickness   Wound Odor None       No associated orders.     Wound Cleaning and Dressings: Head  Showering directions: May shower with protection  Wound cleansing:  Cleanse with normal saline or wound cleanser  Wound cleaning frequency: 3 times a week  Wound product:Honey gel, honey alginate, bordered gauze  Dressing change frequency:  Change dressing 3x per week  Enzymatic agent:  Honey    Miscellaneous/Additional Orders:  Miscellaneous orders: Home health care nurse for wound care    Care Summary:  Care Summary: Discussed Plan of Care at beside with patient. Patient verbally acknowledges  understanding of all instructions and all questions were answered.    Follow Up:  Return in about 2 weeks (around 5/30/2025) for Wound followup.      Additional Notes:  NEW referral, please call if approved or denied [phone]. Thanks